# Patient Record
Sex: MALE | Race: OTHER | Employment: UNEMPLOYED | URBAN - METROPOLITAN AREA
[De-identification: names, ages, dates, MRNs, and addresses within clinical notes are randomized per-mention and may not be internally consistent; named-entity substitution may affect disease eponyms.]

---
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
## 2023-05-11 RX ORDER — ONDANSETRON 4 MG/1
TABLET, ORALLY DISINTEGRATING ORAL EVERY 8 HOURS PRN
COMMUNITY
Start: 2021-06-07

## 2023-05-11 RX ORDER — BUTALBITAL, ACETAMINOPHEN AND CAFFEINE 300; 40; 50 MG/1; MG/1; MG/1
1 CAPSULE ORAL EVERY 4 HOURS PRN
COMMUNITY
Start: 2021-06-07

## 2023-11-30 ENCOUNTER — OFFICE VISIT (OUTPATIENT)
Age: 18
End: 2023-11-30

## 2023-11-30 VITALS
WEIGHT: 146.8 LBS | OXYGEN SATURATION: 100 % | HEART RATE: 59 BPM | TEMPERATURE: 98.4 F | SYSTOLIC BLOOD PRESSURE: 114 MMHG | DIASTOLIC BLOOD PRESSURE: 69 MMHG

## 2023-11-30 DIAGNOSIS — M94.0 COSTOCHONDRITIS: Primary | ICD-10-CM

## 2023-11-30 DIAGNOSIS — S46.812A STRAIN OF LEFT TRAPEZIUS MUSCLE, INITIAL ENCOUNTER: ICD-10-CM

## 2023-11-30 RX ORDER — DICLOFENAC SODIUM 75 MG/1
75 TABLET, DELAYED RELEASE ORAL 2 TIMES DAILY PRN
Qty: 30 TABLET | Refills: 0 | Status: SHIPPED | OUTPATIENT
Start: 2023-11-30

## 2024-02-16 ENCOUNTER — OFFICE VISIT (OUTPATIENT)
Age: 19
End: 2024-02-16

## 2024-02-16 VITALS
DIASTOLIC BLOOD PRESSURE: 80 MMHG | WEIGHT: 144.8 LBS | TEMPERATURE: 97.3 F | HEART RATE: 67 BPM | RESPIRATION RATE: 20 BRPM | SYSTOLIC BLOOD PRESSURE: 139 MMHG | OXYGEN SATURATION: 100 %

## 2024-02-16 DIAGNOSIS — L02.91 ABSCESS: Primary | ICD-10-CM

## 2024-02-16 RX ORDER — DOXYCYCLINE HYCLATE 100 MG
100 TABLET ORAL 2 TIMES DAILY
Qty: 14 TABLET | Refills: 0 | Status: SHIPPED | OUTPATIENT
Start: 2024-02-16 | End: 2024-02-23

## 2024-02-16 NOTE — PROGRESS NOTES
Felix Davies (:  2005) is a 18 y.o. male,Established patient, here for evaluation of the following chief complaint(s):  Other (Noticed the bump this morning, hurts to talk, eat and move lips all together, just painful.)      ASSESSMENT/PLAN:  Visit Diagnoses and Associated Orders       Abscess    -  Primary    doxycycline hyclate (VIBRA-TABS) 100 MG tablet [2625]                   Seen today for skin and soft tissue infection of the lip/face  Will treat with doxycycline 2x daily for 7 days  Warm compresses to the area  Tylenol and ibuprofen as needed for the discomfort  Monitor symptoms closely, if you develop fevers/chills, rapidly spreading redness/swelling, purulent drainage, swelling of the tongue or throat or if you are having a difficult time breathing or swallowing, please go to the ED    SUBJECTIVE/OBJECTIVE:  HPI     18 y.o. male presents with symptoms of swelling and pain to left side of face. Notes that he woke up this morning with some tenderness and swelling to the inside of his left nare and down towards his lip. This improved somewhat through the day but then has worsened somewhat this afternoon. No fevers or chills, no body aches or fatigue. No drainage or discharge from his nose or from the area of swelling. He denies swelling of his tongue or throat. He does note an area of dry cracked skin near his left nare. Denies history of MRSA. Denies any drug allergies. Denies significant redness. Reports pain and discomfort of the lip, especially when talking, eating and moving his lips.         Vitals:    24 1759   BP: 139/80   Site: Left Upper Arm   Position: Sitting   Cuff Size: Small Adult   Pulse: 67   Resp: 20   Temp: 97.3 °F (36.3 °C)   SpO2: 100%   Weight: 65.7 kg (144 lb 12.8 oz)       No results found for this visit on 24.     Physical Exam  Vitals and nursing note reviewed.   Constitutional:       General: He is not in acute distress.     Appearance: Normal appearance. He

## 2024-02-16 NOTE — PATIENT INSTRUCTIONS
Seen today for skin and soft tissue infection of the lip/face  Will treat with doxycycline 2x daily for 7 days  Warm compresses to the area  Tylenol and ibuprofen as needed for the discomfort  Monitor symptoms closely, if you develop fevers/chills, rapidly spreading redness/swelling, purulent drainage, swelling of the tongue or throat or if you are having a difficult time breathing or swallowing, please go to the ED